# Patient Record
Sex: FEMALE | Race: BLACK OR AFRICAN AMERICAN
[De-identification: names, ages, dates, MRNs, and addresses within clinical notes are randomized per-mention and may not be internally consistent; named-entity substitution may affect disease eponyms.]

---

## 2020-03-03 ENCOUNTER — HOSPITAL ENCOUNTER (EMERGENCY)
Dept: HOSPITAL 61 - ER | Age: 35
Discharge: HOME | End: 2020-03-03
Payer: COMMERCIAL

## 2020-03-03 VITALS
SYSTOLIC BLOOD PRESSURE: 139 MMHG | DIASTOLIC BLOOD PRESSURE: 93 MMHG | SYSTOLIC BLOOD PRESSURE: 139 MMHG | DIASTOLIC BLOOD PRESSURE: 93 MMHG

## 2020-03-03 VITALS — HEIGHT: 67 IN | WEIGHT: 130.29 LBS | BODY MASS INDEX: 20.45 KG/M2
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
## 2020-03-03 DIAGNOSIS — M79.602: ICD-10-CM
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
## 2020-03-03 DIAGNOSIS — M79.632: ICD-10-CM

## 2020-03-03 DIAGNOSIS — F10.10: ICD-10-CM
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
## 2020-03-03 DIAGNOSIS — F17.200: ICD-10-CM

## 2020-03-03 DIAGNOSIS — M25.512: ICD-10-CM

## 2020-03-03 DIAGNOSIS — S62.325A: Primary | ICD-10-CM

## 2020-03-03 PROCEDURE — 72040 X-RAY EXAM NECK SPINE 2-3 VW: CPT

## 2020-03-03 PROCEDURE — 29125 APPL SHORT ARM SPLINT STATIC: CPT

## 2020-03-03 PROCEDURE — 73030 X-RAY EXAM OF SHOULDER: CPT

## 2020-03-03 PROCEDURE — 73090 X-RAY EXAM OF FOREARM: CPT

## 2020-03-03 PROCEDURE — 73130 X-RAY EXAM OF HAND: CPT

## 2020-03-03 PROCEDURE — 99284 EMERGENCY DEPT VISIT MOD MDM: CPT

## 2020-03-03 NOTE — RAD
HAND LEFT 3V

 

History: Fourth metacarpal fracture.

 

Technique: 3 views left hand.

 

Comparison: None.

 

Findings:

Acute mildly displaced fourth metacarpal fracture. No additional fracture.

 

Impression: 

1.  Acute mildly displaced fourth metacarpal fracture.

 

Electronically signed by: Dao Chris DO (3/3/2020 5:07 AM) [id number]
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
## 2020-03-03 NOTE — RAD
SHOULDER 2+V LEFT, FOREARM LEFT

 

History: Trauma. Pain.

 

Technique: 2 views left shoulder and 2 views left forearm.

 

Comparison: None.

 

Findings:

Normal alignment of the glenohumeral and acromial clavicular joints. No 

fracture.

 

Normal alignment of the forearm. Fourth metacarpal shaft fracture 

partially imaged.

 

Impression: 

1.  Fourth metacarpal shaft fracture. Dedicated hand radiograph can 

further assess.

 

Electronically signed by: Dao Chris DO (3/3/2020 4:44 AM) [id number]

## 2020-03-03 NOTE — RAD
CERVICAL SPINE 2-3V

 

History: Trauma. Pain.

 

Technique: 2 views cervical spine.

 

Comparison: None.

 

Findings:

Normal vertebral body height and alignment. No fracture. Soft tissues 

unremarkable.

 

Impression: 

1.  No acute osseous abnormality.

 

Electronically signed by: Dao Chris DO (3/3/2020 4:45 AM) [id number]

## 2020-09-29 ENCOUNTER — HOSPITAL ENCOUNTER (OUTPATIENT)
Dept: HOSPITAL 61 - SPEC | Age: 35
Discharge: HOME | End: 2020-09-29
Attending: OBSTETRICS & GYNECOLOGY
Payer: COMMERCIAL

## 2020-09-29 DIAGNOSIS — N89.8: Primary | ICD-10-CM

## 2021-08-31 ENCOUNTER — HOSPITAL ENCOUNTER (EMERGENCY)
Dept: HOSPITAL 61 - ER | Age: 36
Discharge: HOME | End: 2021-08-31
Payer: MEDICAID

## 2021-08-31 VITALS — HEIGHT: 67 IN | WEIGHT: 152.78 LBS | BODY MASS INDEX: 23.98 KG/M2

## 2021-08-31 VITALS — DIASTOLIC BLOOD PRESSURE: 84 MMHG | SYSTOLIC BLOOD PRESSURE: 148 MMHG

## 2021-08-31 DIAGNOSIS — F17.200: ICD-10-CM

## 2021-08-31 DIAGNOSIS — R05: Primary | ICD-10-CM

## 2021-08-31 PROCEDURE — 99283 EMERGENCY DEPT VISIT LOW MDM: CPT

## 2021-08-31 PROCEDURE — 71046 X-RAY EXAM CHEST 2 VIEWS: CPT

## 2021-08-31 NOTE — RAD
INDICATION: Reason: cough and congestion / Spl. Instructions:  / History: 



COMPARISON: None.



FINDINGS:



2 view of chest obtained.

No definite focal airspace consolidation.

Cardiac silhouette unremarkable.





IMPRESSION:



*  No focal airspace consolidation or edema.



Electronically signed by: Landry Tripp MD (8/31/2021 2:11 PM) [id number]

## 2021-08-31 NOTE — PHYS DOC
Past Medical History


Past Medical History:  No Pertinent History


Past Surgical History:  No Surgical History


Smoking Status:  Current Every Day Smoker


Alcohol Use:  Occasionally


Drug Use:  None





General Adult


EDM:


Chief Complaint:  SORE THROAT





HPI:


HPI:





Patient is a 35  year old female who presents emergency department with a chief 

complaint of cold-like signs and symptoms for the past 2 months.  Patient states

she has had a stuffy nose and cough and wants to know why.  Patient denies 

receiving the COVID-19 virus vaccination series, states that she does not 

believe and vaccinations.  Patient denies chest pain, chest palpitations, 

nausea, vomiting, or diarrhea.  Patient denies fever or chills at home.  Patient

denies any other physical complaints or physical concerns.  Patient reports her 

last menstrual cycle was 2 weeks ago with normal duration of flow.  Patient 

reports being a daily cigarette smoker, occasional alcohol use on weekends, 

denies illicit drug use or marijuana use.





Review of Systems:


Review of Systems:


14 body systems of review of systems have been reviewed.  See HPI for pertinent 

positives and negative responses, otherwise all other systems are negative, 

nonpertinent or noncontributory.


Constitutional: Negative except as outlined in HPI above.


Skin: Negative except as outlined in HPI above.


Eyes:   Negative except as outlined in HPI above.


HENT: Negative except as outlined in HPI above.


Respiratory:   Negative except as outlined in HPI above.


Cardiovascular:   Negative except as outlined in HPI above.


GI:   Negative except as outlined in HPI above.


:  Negative except as outlined in HPI above.


Musculoskeletal:   Negative except as outlined in HPI above.


Integument:   Negative except as outlined in HPI above.


Neurologic:   Negative except as outlined in HPI above.


Endocrine:   Negative except as outlined in HPI above.


Lymphatic:  Negative except as outlined in HPI above.


Psychiatric:  Negative except as outlined in HPI above.





Heart Score:


C/O Chest Pain:  No


Risk Factors:


Risk Factors:  DM, Current or recent (<one month) smoker, HTN, HLP, family 

history of CAD, obesity.


Risk Scores:


Score 0 - 3:  2.5% MACE over next 6 weeks - Discharge Home


Score 4 - 6:  20.3% MACE over next 6 weeks - Admit for Clinical Observation


Score 7 - 10:  72.7% MACE over next 6 weeks - Early Invasive Strategies





Allergies:


Allergies:





Allergies








Coded Allergies Type Severity Reaction Last Updated Verified


 


  No Known Drug Allergies    3/11/14 No











Physical Exam:


PE:





Constitutional: Well developed, well nourished, no acute distress, non-toxic 

appearance. []


HENT: Normocephalic, atraumatic, bilateral external ears normal, oropharynx 

moist, no oral exudates, nose normal. []


Eyes: PERRLA, EOMI, conjunctiva normal, no discharge. [] 


Neck: Normal range of motion, no tenderness, supple, no stridor. [] 


Cardiovascular:Heart rate regular rhythm, no murmur []


Lungs & Thorax:  Bilateral breath sounds clear to auscultation []


Abdomen: Bowel sounds normal, soft, no tenderness, no masses, no pulsatile 

masses. [] 


Skin: Warm, dry, no erythema, no rash. [] 


Back: No tenderness, no CVA tenderness. [] 


Extremities: No tenderness, no cyanosis, no clubbing, ROM intact, no edema. [] 


Neurologic: Alert and oriented X 3, normal motor function, normal sensory 

function, no focal deficits noted. []


Psychologic: Affect normal, judgement normal, mood normal. []





Current Patient Data:


Vital Signs:





                                   Vital Signs








  Date Time  Temp Pulse Resp B/P (MAP) Pulse Ox O2 Delivery O2 Flow Rate FiO2


 


21 13:22 98.4 87 18 148/84 (108) 100 Room Air  





 98.4       











EKG:


EKG:


[]





Radiology/Procedures:


Radiology/Procedures:


PATIENT: CARLOTTA MAURER    ACCOUNT: [id number]     MRN#: [id number]


: 1985           LOCATION: ER              AGE: 35


SEX: F                    EXAM DT: 21         ACCESSION#: [id number]


STATUS: REG ER            ORD. PHYSICIAN: PAUL WILKES


REASON: cough and congestion


PROCEDURE: CHEST PA & LATERAL








INDICATION: Reason: cough and congestion / Spl. Instructions:  / History: 





COMPARISON: None.





FINDINGS:





2 view of chest obtained.


No definite focal airspace consolidation.


Cardiac silhouette unremarkable.








IMPRESSION:





*  No focal airspace consolidation or edema.





Electronically signed by: Landry Tripp MD (2021 2:11 PM) [id number]





Course & Med Decision Making:


Course & Med Decision Making


Pertinent Labs and Imaging studies reviewed. (See chart for details)





35-year-old female, vital signs reviewed, presents to the emergency department 

concerning cough for the past 2 months.  Patient's physical examination 

unremarkable, lung sounds were clear to auscultate, patient is nontoxic in 

appearance, afebrile, 100% O2 sat on room air, patient is not tachycardic.  Will

 order chest x-ray, offered COVID-19 virus testing, patient states she does not 

believe in the COVID-19 virus and would not believe the results.  Patient 

reports vaccination is against her Yarsani.





Patient's chest x-ray unremarkable, upon reevaluation of the patient, patient 

remains nontoxic in appearance, no respiratory distress.  Discussed findings 

with patient who is relieved to know she does not have a pneumonia.  Patient 

states she will follow up with her primary care doctor this week.  Patient is 

amenable with ED discharge planning.  Discussed with the patient all findings 

and diagnostic testing as well as the need to follow-up with their primary care 

provider for further evaluation and treatment or return to the ED if any new or 

worsening symptoms.  Strict return precautions were also discussed at length, 

the patient voiced understanding and agreement with the discharge planning.  The

 patient was nontoxic in appearance, in no apparent distress, and 

hemodynamically stable at the time of disposition.





Dragon Disclaimer:


Dragon Disclaimer:


This electronic medical record was generated, in whole or in part, using a voice

 recognition dictation system.





Departure


Departure


Impression:  


   Primary Impression:  


   Cough in adult


Disposition:  01 HOME / SELF CARE / HOMELESS


Condition:  GOOD


Referrals:  


NO PCP (PCP)


Patient Instructions:  Cough, Adult





Additional Instructions:  


You were seen today in the emergency department for a cough.  Your chest x-ray 

did not show any concerning findings.  As we discussed, please use 

over-the-counter Zyrtec or Claritin.  Please follow-up with your primary care ph

ysician tomorrow for ongoing evaluation of your symptoms.  Please return the 

emergency department for worsening symptoms or other concerns.  Thank you for 

visiting our Emergency Department.  It was a pleasure taking care of you today 

in the emergency department and we appreciate you trusting us with your care. If

 any additional problems come up don't hesitate to return to visit us. Please 

follow up with your primary care provider so they can plan additional care if 

needed and know about the problem that you had. If symptoms worsen come back to 

the Emergency Department. Any concerning symptoms that start such as chest pain,

 shortness of air, weakness or numbness on one side of the body, running high 

fevers or any other concerning symptoms return to the ER.





EMERGENCY DEPARTMENT GENERAL DISCHARGE INSTRUCTIONS





Thank you for coming to [facility] Emergency Department (ED) 

today and 


trusting us with you care.  We trust that you had a positive experience in our 

Emergency 


Department.  If you wish to speak to the department management, you may call the

 Director at 


[phone].





YOUR FOLLOW UP INSTRUCTIONS ARE AS FOLLOWS:





1.  Do you have a private Doctor?  If you do not have a private doctor, please 

ask for a 


resource list of physicians or clinics that may be able to assist you with 

follow up care.





2.  The Emergency Physicain has interpreted your x-rays.  The X-Ray specialist 

will also 


review them.  If there is a change in the findings, you will be notified in 48 

hours when at 


all possible.





3.  A lab test or culture has been done, your results will be reviewed and you 

will be 


notified if you need a change in treatment.





ADDITIONAL INSTRUCTIONS AND INFORMATION:





1.  Your care today has been supervised by a physician who is specially trained 

in emergency 


care.  Many problems require more than one evaluation for a complete diagnosis 

and 


treatment.  We recommend that you schedule your follow up appointment as 

recommended to 


ensure complete treatment of you illness or injury.  If you are unable to obtain

 follow up 


care and continue to have a problem, or if your condition worsens, we recommend 

that you 


return to the ED.





2.  We are not able to safely determine your condition over the phone nor are we

 able to 


give sound medical advice over the phone.  For these safety reasons, if you call

 for medical 


advice we will ask you to come to the ED for further evaluation.





3.  If you have any questions regarding these discharge instructions please call

 the ED at 


[phone].





SAFETY INFORMATION:





In the interest of safety, wellness, and injury prevention; we encourage you to 

wear your 


sealbelt, if you smoke; quite smoking, and we encourage family to use a 

protective helmet 


for bicycling and other sporting events that present an increased risk for head 

injury.





IF YOUR SYMPTOMS WORSEN OR NEW SYMPTOMS DEVELOP, OR YOU HAVE CONCERNS ABOUT YOUR

 CONDITION; 


OR IF YOUR CONDITION WORSENS WHILE YOU ARE WAITING FOR YOUR FOLLOW UP 

APPOINTMENT; EITHER 


CONTACT YOUR PRIMARY CARE DOCTOR, THE PHYSICIAN WHOSE NAME AND NUMBER YOU WERE 

GIVEN, OR 


RETURN TO THE ED IMMEDIATELY.











PAUL WILKES       Aug 31, 2021 13:43